# Patient Record
Sex: FEMALE | Race: WHITE
[De-identification: names, ages, dates, MRNs, and addresses within clinical notes are randomized per-mention and may not be internally consistent; named-entity substitution may affect disease eponyms.]

---

## 2018-01-02 ENCOUNTER — HOSPITAL ENCOUNTER (OUTPATIENT)
Dept: HOSPITAL 47 - RADMAMWWP | Age: 73
Discharge: HOME | End: 2018-01-02
Payer: MEDICARE

## 2018-01-02 DIAGNOSIS — Z12.31: Primary | ICD-10-CM

## 2018-01-02 PROCEDURE — 77067 SCR MAMMO BI INCL CAD: CPT

## 2018-01-02 PROCEDURE — 77063 BREAST TOMOSYNTHESIS BI: CPT

## 2018-01-03 NOTE — MM
Reason for exam: screening  (asymptomatic).

Last mammogram was performed 1 year and 2 months ago.



History:

Patient is postmenopausal and is nulliparous.



Physical Findings:

A clinical breast exam by your physician is recommended on an annual basis and 

results should be correlated with mammographic findings.



MG 3D Screening Mammo W/Cad

Bilateral CC and MLO view(s) were taken.

Prior study comparison: October 25, 2016, bilateral MG 3d screening mammo w/cad.  

August 31, 2012, bilateral digital screening mammo w/CAD.

Focal asymmetry in the right upper outer quadrant.  No significant changes when 

compared with prior studies.





ASSESSMENT: Benign, BI-RAD 2



RECOMMENDATION:

Routine screening mammogram of both breasts in 1 year.

## 2019-02-08 ENCOUNTER — HOSPITAL ENCOUNTER (OUTPATIENT)
Dept: HOSPITAL 47 - RADBDWWP | Age: 74
Discharge: HOME | End: 2019-02-08
Attending: FAMILY MEDICINE
Payer: MEDICARE

## 2019-02-08 DIAGNOSIS — M85.80: Primary | ICD-10-CM

## 2019-02-08 PROCEDURE — 77080 DXA BONE DENSITY AXIAL: CPT

## 2019-02-11 NOTE — BD
EXAMINATION TYPE: Axial Bone Density

 

DATE OF EXAM: 2/8/2019

 

COMPARISON: 10/25/2016

 

CLINICAL HISTORY:

 

Height:  59.7 IN

Weight:  133 LBS

 

FRAX RISK QUESTIONS:

Rheumatoid Arthritis: YES

 

RISK FACTORS 

HISTORY OF: 

Active: YES

Postmenopausal woman: AGE 49

 

MEDICATIONS: 

Additional Medications: VIT D, CALCIUM, LISINOPRIL, VIT E, BIOTIN, ASPIRIN, B12, GLUCOSAMINE, ATORVAS
TATIN 

 

 

 

EXAM MEASUREMENTS: 

Bone mineral densitometry was performed using the [employer] System.

Bone mineral density as measured about the Lumbar spine is:  

----- L1-L4(G/cm2): 1.064

T Score Values are as follows:

----- L2: -1.3

----- L3: -0.5

----- L4: -0.7

----- L1-L4: -1.0

Bone mineral density has: Decreased -0.7% since study of: 10/25/2016

 

Bone mineral density about the R hip (g/cm2): 0.699

Bone mineral density about the L hip (g/cm2): 0.687

T Score values are as follows:

-----R Neck: -2.4

-----L Neck: -2.5

-----R Total: -1.5

-----L Total: -1.8

Bone mineral density has: Decreased -1.1% since study of: 10/25/2016

 

 

IMPRESSION:

Osteopenia (T Score between -2.5 and -1).

 

There is slightly increased risk of fracture and the patient may be considered 

for treatment. 

 

Re-Screen 2-5 years.

 

 

 

 

 

NOTE:  T-SCORE=SD OF THE YOUNG ADULT MEAN.

## 2019-02-12 ENCOUNTER — HOSPITAL ENCOUNTER (OUTPATIENT)
Dept: HOSPITAL 47 - RADMAMWWP | Age: 74
Discharge: HOME | End: 2019-02-12
Attending: FAMILY MEDICINE
Payer: MEDICARE

## 2019-02-12 DIAGNOSIS — Z12.31: Primary | ICD-10-CM

## 2019-02-12 PROCEDURE — 77067 SCR MAMMO BI INCL CAD: CPT

## 2019-02-12 PROCEDURE — 77063 BREAST TOMOSYNTHESIS BI: CPT

## 2019-02-14 NOTE — MM
Reason for exam: screening  (asymptomatic).

Last mammogram was performed 1 year and 1 month ago.



History:

Patient is postmenopausal and is nulliparous.



Physical Findings:

A clinical breast exam by your physician is recommended on an annual basis and 

results should be correlated with mammographic findings.



MG 3D Screening Mammo W/Cad

Bilateral CC and MLO view(s) were taken.

Prior study comparison: January 2, 2018, bilateral MG 3d screening mammo w/cad.  

October 25, 2016, bilateral MG 3d screening mammo w/cad.

The breast tissue is heterogeneously dense. This may lower the sensitivity of 

mammography.  No significant changes when compared with prior studies.





ASSESSMENT: Negative, BI-RAD 1



RECOMMENDATION:

Routine screening mammogram of both breasts in 1 year.

## 2019-03-01 ENCOUNTER — HOSPITAL ENCOUNTER (OUTPATIENT)
Dept: HOSPITAL 47 - ORWHC2ENDO | Age: 74
Discharge: HOME | End: 2019-03-01
Attending: INTERNAL MEDICINE
Payer: MEDICARE

## 2019-03-01 VITALS — RESPIRATION RATE: 16 BRPM

## 2019-03-01 VITALS — TEMPERATURE: 98.7 F

## 2019-03-01 VITALS — BODY MASS INDEX: 25.1 KG/M2

## 2019-03-01 VITALS — SYSTOLIC BLOOD PRESSURE: 101 MMHG | HEART RATE: 71 BPM | DIASTOLIC BLOOD PRESSURE: 65 MMHG

## 2019-03-01 DIAGNOSIS — K52.9: Primary | ICD-10-CM

## 2019-03-01 DIAGNOSIS — Z79.899: ICD-10-CM

## 2019-03-01 DIAGNOSIS — Z87.891: ICD-10-CM

## 2019-03-01 DIAGNOSIS — I10: ICD-10-CM

## 2019-03-01 DIAGNOSIS — K22.70: ICD-10-CM

## 2019-03-01 DIAGNOSIS — Z79.82: ICD-10-CM

## 2019-03-01 DIAGNOSIS — K44.9: ICD-10-CM

## 2019-03-01 DIAGNOSIS — E78.5: ICD-10-CM

## 2019-03-01 PROCEDURE — 88305 TISSUE EXAM BY PATHOLOGIST: CPT

## 2019-03-01 PROCEDURE — 43239 EGD BIOPSY SINGLE/MULTIPLE: CPT

## 2019-03-01 PROCEDURE — 45380 COLONOSCOPY AND BIOPSY: CPT

## 2019-03-01 NOTE — P.PCN
Date of Procedure: 03/01/19


Procedure(s) Performed: 


Brief history:


Patient is a pleasant 73-year-old white female, scheduled for an elective upper 

endoscopy as well as colonoscopy as a part of evaluation of abdominal pain and 

chronic diarrhea for the last 3 months duration.  She has bowel movements every 

day with no blood or mucus in the stool.  No weight loss.





Procedure performed:


Esophagogastroduodenoscopy with biopsy


Colonoscopy with biopsies





Preoperative diagnosis:


Abdominal pain


Chronic diarrhea





Anesthesia: MAC





Procedure:


After informed consent was obtained from the patient  was brought into the 

endoscopy unit and IV  sedation was administered by anesthesia under continuous 

monitoring.  Initially upper endoscopy was done.  The Olympus  video 

endoscope was inserted inserted into the mouth and esophagus intubated without 

any difficulty and was gradually advanced into the stomach and duodenum and 

carefully examined.  The bulb and second part of the duodenum appeared normal.  

Biopsies were done from the duodenum to rule out celiac disease.  The scope was 

then withdrawn into the stomach adequately insufflated with air and upon 

careful examination  the antrum and body, cardia and fundus appeared normal.  

The scope was then withdrawn into the esophagus.  The GE junction was located 

at 32 cm to the incisors.  It appeared regular but there was a short tongue of 

Robertson's appearing mucosa extending 5 mm proximal to the GE junction and this 

was biopsied.  Rest of the esophagus appeared normal.  Patient tolerated the 

procedure well.





At this time the patient continued to remain sedation.  Initial digital rectal 

examination was normal.  Olympus  video colonoscope was then inserted 

into the rectum and gradually advanced to the cecum without any difficulty.  

Careful examination was performed as the scope was gradually being withdrawn.  

The prep was excellent.  The cecum, ascending colon, transverse colon, 

descending colon, sigmoid colon and rectum appeared normal.  Retroflexion was 

performed in the rectum and no lesions were noted.  Random biopsies were done 

from ascending and descending colon to rule out microscopic/collagenous 

colitis.  Patient tolerated the procedure well.





Impression:


1.  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 

hernia


2.  Colonoscopy was essentially within normal limits with no evidence of 

colitis or colorectal neoplasia.  








Recommendations:


Findings of this examination were discussed with the patient as well as her 

family.  She was advised to follow with the biopsy results.shows advised to use 

over-the-counter Imodium as needed for the chronic diarrhea.

## 2021-06-28 ENCOUNTER — HOSPITAL ENCOUNTER (OUTPATIENT)
Dept: HOSPITAL 47 - RADMAMWWP | Age: 76
Discharge: HOME | End: 2021-06-28
Attending: FAMILY MEDICINE
Payer: MEDICARE

## 2021-06-28 DIAGNOSIS — Z78.0: ICD-10-CM

## 2021-06-28 DIAGNOSIS — Z80.3: ICD-10-CM

## 2021-06-28 DIAGNOSIS — Z12.31: Primary | ICD-10-CM

## 2021-06-28 PROCEDURE — 77067 SCR MAMMO BI INCL CAD: CPT

## 2021-06-28 PROCEDURE — 77063 BREAST TOMOSYNTHESIS BI: CPT

## 2021-06-29 NOTE — MM
Reason for exam: screening  (asymptomatic).

Last mammogram was performed 2 years and 5 months ago.



History:

Patient is postmenopausal and is nulliparous.

Family history of breast cancer in sister at age 71.



Physical Findings:

A clinical breast exam by your physician is recommended on an annual basis and 

results should be correlated with mammographic findings.



MG 3D Screening Mammo W/Cad

Bilateral CC and MLO view(s) were taken.

Prior study comparison: February 12, 2019, bilateral MG 3d screening mammo w/cad. 

January 2, 2018, bilateral MG 3d screening mammo w/cad.

The breast tissue is heterogeneously dense. This may lower the sensitivity of 

mammography.  Stable benign calcifications. There is no discrete abnormality.  No 

significant changes when compared with prior studies.





ASSESSMENT: Benign, BI-RAD 2



RECOMMENDATION:

Routine screening mammogram of both breasts in 1 year.

## 2022-06-03 ENCOUNTER — HOSPITAL ENCOUNTER (OUTPATIENT)
Dept: HOSPITAL 47 - ORWHC2ENDO | Age: 77
Discharge: HOME | End: 2022-06-03
Attending: INTERNAL MEDICINE
Payer: MEDICARE

## 2022-06-03 VITALS — DIASTOLIC BLOOD PRESSURE: 69 MMHG | SYSTOLIC BLOOD PRESSURE: 104 MMHG | HEART RATE: 61 BPM

## 2022-06-03 VITALS — RESPIRATION RATE: 16 BRPM | TEMPERATURE: 97.3 F

## 2022-06-03 VITALS — BODY MASS INDEX: 21.5 KG/M2

## 2022-06-03 DIAGNOSIS — Z90.49: ICD-10-CM

## 2022-06-03 DIAGNOSIS — E78.5: ICD-10-CM

## 2022-06-03 DIAGNOSIS — Z12.11: Primary | ICD-10-CM

## 2022-06-03 DIAGNOSIS — Z79.899: ICD-10-CM

## 2022-06-03 DIAGNOSIS — K63.5: ICD-10-CM

## 2022-06-03 DIAGNOSIS — I10: ICD-10-CM

## 2022-06-03 PROCEDURE — 45385 COLONOSCOPY W/LESION REMOVAL: CPT

## 2022-06-03 PROCEDURE — 88305 TISSUE EXAM BY PATHOLOGIST: CPT

## 2022-06-03 PROCEDURE — 45380 COLONOSCOPY AND BIOPSY: CPT

## 2022-06-03 NOTE — P.PCN
Date of Procedure: 06/03/22


Procedure(s) Performed: 


BRIEF HISTORY: Patient is a 76-year-old pleasant female scheduled for an 

elective colonoscopy as a part of screening for colorectal neoplasia.  





PROCEDURE PERFORMED: Colonoscopy with biopsy and snare polyp at. 





PREOPERATIVE DIAGNOSIS: Screening for colon cancer. 





IV sedation per Anesthesia. 





PROCEDURE: After informed consent was obtained, the patient, was brought into 

the endoscopy unit. IV sedation was administered by Anesthesia under continuous 

monitoring.  Digital rectal examination was normal. Initially the Olympus CF-160

flexible video colonoscope was then inserted in the rectum, gradually advanced 

into the cecum without any difficulty. Careful examination was performed as the 

scope was gradually being withdrawn. Ileocecal valve and the appendiceal orifice

were visualized and appeared normal.  Prep was excellent. Mucosa of the cecum, 

appeared normal.  There was a 3 mm cecal polyp that was removed by cold biopsy. 

Rest of the ascending colon, transverse colon, descending colon, appeared 

normal.  The sigmoid: There was a 6 minute a polyp that was removed by snare 

polypectomy.  Rest of the sigmoid colon, and rectum appeared normal. 

Retroflexion was performed in the rectum and no lesions were seen. The patient 

tolerated the procedure well. 





IMPRESSION: 


3 mm cecal polyp status post cold biopsy


6 mm sigmoid polyp status post polypectomy





RECOMMENDATIONS:  Findings of this examination were discussed with the patient 

as well as a family.  She was advised to follow with the biopsy results.  If the

biopsy reveals adenoma she can have a repeat colonoscopy in 5 years.

## 2022-10-19 ENCOUNTER — HOSPITAL ENCOUNTER (OUTPATIENT)
Dept: HOSPITAL 47 - RADMAMWWP | Age: 77
Discharge: HOME | End: 2022-10-19
Attending: FAMILY MEDICINE
Payer: MEDICARE

## 2022-10-19 DIAGNOSIS — Z80.3: ICD-10-CM

## 2022-10-19 DIAGNOSIS — M81.0: ICD-10-CM

## 2022-10-19 DIAGNOSIS — Z12.31: Primary | ICD-10-CM

## 2022-10-19 DIAGNOSIS — Z78.0: ICD-10-CM

## 2022-10-19 PROCEDURE — 77063 BREAST TOMOSYNTHESIS BI: CPT

## 2022-10-19 PROCEDURE — 77080 DXA BONE DENSITY AXIAL: CPT

## 2022-10-19 PROCEDURE — 77067 SCR MAMMO BI INCL CAD: CPT

## 2022-10-19 NOTE — BD
EXAMINATION TYPE: Axial Bone Density

 

DATE OF EXAM: 10/19/2022

 

COMPARISON: NONE

 

CLINICAL HISTORY: 77 years year old Female.  ICD-10 CODE: M85.89 BONE DISORDERS

 

Height:  5 FT 1 IN

Weight:  112

 

FRAX RISK QUESTIONS:

Alcohol (3 or more units per day):  NO

Family History (Parent hip fracture):  NO

Glucocorticoids (More than 3mos):  NO

           (Ex: prednisone, prednisolone, methylprednisolone, dexamethasone, and hydrocortisone).    
     

History of Fracture in Adulthood: NO

Secondary Osteoporosis:

  1.  Type 1 Diabetes: NO

  2.  Hyperthyroidism: NO

  3.  Menopause before 45: YES

  4.  Malnutrition: NO

  5.  Chronic liver disease: NO

Rheumatoid Arthritis: NO

Current Tobacco Use: NO

 

RISK FACTORS 

HISTORY OF: 

Surgery to Spine/Hip(right/left)/Wrist (right/left): NO

Family History of Osteoporosis: NO

Active: YES

Diet low in dairy products/other sources of calcium:  NO

Postmenopausal woman: YES

Take estrogen and/or progesterone medications: NO

Lost more than 2 inches in height since high school: NO

Frequent falls: NO

Poor Health: GOOD

Hyperparathyroidism: NO

Adrenal Insufficiency: NO

 

MEDICATIONS: 

Additional Medications: LISINOPRIL, ATORVASTATIN, CALCIUM,D3

 

 

Additional History:

 

 

EXAM MEASUREMENTS: 

Bone mineral densitometry was performed using the [employer] System.

Bone mineral density as measured about the Lumbar spine is:  

----- L1-L4(G/cm2): 1.047

T Score Values are as follows:

----- L1: -1.3

----- L2: -1.8

----- L3: -0.5

----- L4: -0.5

----- L1-L4: -1.1

Bone mineral density has: DECREASED  -3.4 % since study of: 2016

 

Bone mineral density about the R hip (g/cm2): 0.661

Bone mineral density about the L hip (g/cm2): 0.627

T Score values are as follows:

-----R Neck: -2.7

-----L Neck: -3.0

-----R Total: -2.3

-----L Total: -2.5

Bone mineral density has: DECREASED  -13.0 % since study of: 2016

 

 

FRAX%s: The graph provided illustrates a 19.7 % chance for a major osteoporotic fx and a 8.0 % chance
 for the hips probability for fx in 10 years time.

 

IMPRESSION:

Osteoporosis (T Score less than -2.5).

 

There is increased fracture risk and therapy is usually indicated based on age.

 

Re-Screen 1-2 years.

 

NOTE:  T-SCORE=SD OF THE YOUNG ADULT MEAN.

## 2022-10-20 NOTE — MM
Reason for Exam: Screening  (asymptomatic). 

Last mammogram was performed 1 year(s) and 4 month(s) ago. 





Patient History: 

Menarche at age 16. Patient has no children. Postmenopausal.

Sister had breast cancer, age 71. 





Risk Values: 

Emilia 5 year model risk: 3.2%.

NCI Lifetime model risk: 6.4%.





Prior Study Comparison: 

1/2/2018 Bilateral Screening Mammogram, [facility]. 2/12/2019 Bilateral Screening Mammogram, [facility]. 6/28/2021

Bilateral Screening Mammogram, [facility]. 





Tissue Density: 

The breast tissue is heterogeneously dense. This may lower the sensitivity of mammography.





Findings: 

Analyzed By CAD. 

Some benign-appearing round and vascular calcifications bilaterally are redemonstrated.

Benign-appearing bilateral axillary lymph nodes are again seen.



There is no suspicious group of microcalcifications or new suspicious mass in either breast. 





Overall Assessment: Benign, BI-RAD 2





Management: 

Screening Mammogram of both breasts in 1 year.

Some advise bilateral breast ultrasound surveillance in patients with background dense tissue. A

clinical breast exam by your physician is recommended on an annual basis and results should be

correlated with mammographic findings.



Electronically signed and approved by: Memo Patel M.D.

## 2023-07-05 ENCOUNTER — HOSPITAL ENCOUNTER (OUTPATIENT)
Dept: HOSPITAL 47 - LABPAT | Age: 78
Discharge: HOME | End: 2023-07-05
Attending: UROLOGY
Payer: MEDICARE

## 2023-07-05 DIAGNOSIS — N20.1: ICD-10-CM

## 2023-07-05 DIAGNOSIS — R31.0: ICD-10-CM

## 2023-07-05 DIAGNOSIS — Z01.812: Primary | ICD-10-CM

## 2023-07-05 LAB
ANION GAP SERPL CALC-SCNC: 9.1 MMOL/L (ref 4–12)
BASOPHILS # BLD AUTO: 0.04 X 10*3/UL (ref 0–0.1)
BASOPHILS NFR BLD AUTO: 0.5 %
BUN SERPL-SCNC: 16.2 MG/DL (ref 9–27)
BUN/CREAT SERPL: 20.25 RATIO (ref 12–20)
CALCIUM SPEC-MCNC: 10.5 MG/DL (ref 8.7–10.3)
CHLORIDE SERPL-SCNC: 104 MMOL/L (ref 96–109)
CO2 SERPL-SCNC: 26.9 MMOL/L (ref 21.6–31.8)
EOSINOPHIL # BLD AUTO: 0.14 X 10*3/UL (ref 0.04–0.35)
EOSINOPHIL NFR BLD AUTO: 1.9 %
ERYTHROCYTE [DISTWIDTH] IN BLOOD BY AUTOMATED COUNT: 4.14 X 10*6/UL (ref 4.1–5.2)
ERYTHROCYTE [DISTWIDTH] IN BLOOD: 12.7 % (ref 11.5–14.5)
GLUCOSE SERPL-MCNC: 81 MG/DL (ref 70–110)
HCT VFR BLD AUTO: 39.6 % (ref 37.2–46.3)
HGB BLD-MCNC: 12.7 D/DL (ref 12–15)
IMM GRANULOCYTES BLD QL AUTO: 0.1 %
LYMPHOCYTES # SPEC AUTO: 1.87 X 10*3/UL (ref 0.9–5)
LYMPHOCYTES NFR SPEC AUTO: 25.5 %
MCH RBC QN AUTO: 30.7 PG (ref 27–32)
MCHC RBC AUTO-ENTMCNC: 32.1 D/DL (ref 32–37)
MCV RBC AUTO: 95.7 FL (ref 80–97)
MONOCYTES # BLD AUTO: 0.6 X 10*3/UL (ref 0.2–1)
MONOCYTES NFR BLD AUTO: 8.2 %
NEUTROPHILS # BLD AUTO: 4.67 X 10*3/UL (ref 1.8–7.7)
NEUTROPHILS NFR BLD AUTO: 63.8 %
NRBC BLD AUTO-RTO: 0 X 10*3/UL (ref 0–0.01)
PH UR: 6.5 [PH]
PLATELET # BLD AUTO: 284 X 10*3/UL (ref 140–440)
POTASSIUM SERPL-SCNC: 4.8 MMOL/L (ref 3.5–5.5)
SODIUM SERPL-SCNC: 140 MMOL/L (ref 135–145)
SP GR UR: 1 (ref 1–1.03)
UROBILINOGEN UR QL: 0.2 E.U./DL
WBC # BLD AUTO: 7.33 X 10*3/UL (ref 4.5–10)

## 2023-07-05 PROCEDURE — 85025 COMPLETE CBC W/AUTO DIFF WBC: CPT

## 2023-07-05 PROCEDURE — 87086 URINE CULTURE/COLONY COUNT: CPT

## 2023-07-05 PROCEDURE — 36415 COLL VENOUS BLD VENIPUNCTURE: CPT

## 2023-07-05 PROCEDURE — 81001 URINALYSIS AUTO W/SCOPE: CPT

## 2023-07-05 PROCEDURE — 80048 BASIC METABOLIC PNL TOTAL CA: CPT

## 2023-07-11 ENCOUNTER — HOSPITAL ENCOUNTER (OUTPATIENT)
Dept: HOSPITAL 47 - OR | Age: 78
Discharge: HOME | End: 2023-07-11
Attending: UROLOGY
Payer: MEDICARE

## 2023-07-11 VITALS — TEMPERATURE: 97 F

## 2023-07-11 VITALS — BODY MASS INDEX: 20 KG/M2

## 2023-07-11 VITALS — HEART RATE: 65 BPM | DIASTOLIC BLOOD PRESSURE: 63 MMHG | SYSTOLIC BLOOD PRESSURE: 124 MMHG

## 2023-07-11 VITALS — RESPIRATION RATE: 16 BRPM

## 2023-07-11 DIAGNOSIS — Z79.899: ICD-10-CM

## 2023-07-11 DIAGNOSIS — E78.5: ICD-10-CM

## 2023-07-11 DIAGNOSIS — I10: ICD-10-CM

## 2023-07-11 DIAGNOSIS — N20.0: Primary | ICD-10-CM

## 2023-07-11 DIAGNOSIS — Z87.891: ICD-10-CM

## 2023-07-11 DIAGNOSIS — J40: ICD-10-CM

## 2023-07-11 DIAGNOSIS — M19.90: ICD-10-CM

## 2023-07-11 DIAGNOSIS — Z98.890: ICD-10-CM

## 2023-07-11 PROCEDURE — 82365 CALCULUS SPECTROSCOPY: CPT

## 2023-07-11 PROCEDURE — 52353 CYSTOURETERO W/LITHOTRIPSY: CPT

## 2023-07-11 PROCEDURE — 74018 RADEX ABDOMEN 1 VIEW: CPT

## 2023-07-11 NOTE — P.HPIHPCON
History of Present Illness


H&P Date: 07/11/23


Chief Complaint: right ureteral stone





This is a 77-year-old female with history of an 8 mm right-sided proximal stones

and  additional right-sided nonobstructive renal stones.  She is status post 

stent insertion in June.  Presents today for right-sided ureteroscopy with stella

ium laser.  Discussed with her the surgery detail.  Discussed risks which was 

but not limited to bleeding, infection, injury to ureter.  Risk of anesthesia 

was also discussed with her.  She understood all the risk and agreed to proceed 

with right-sided ureteroscopy, with holmium laser lithotripsy, stone basketing 

and stent removal


Consent for Procedure: 


I have explained the operation/procedure to the patient, including the risks, 

benefits, side effects, alternative therapies (including not receiving the 

proposed treatment or service), the likelihood of the patient achieving his/her 

goals, and potential recuperation problems for the procedure/sedation/analgesia,

as well as any blood products, if indicated. I also explained to the patient the

risks, benefits and side effects of the alternatives, as well as the risks 

related to not receiving the proposed procedure, care, treatment, or services.








Past Medical History


Past Medical History: Hyperlipidemia, Hypertension, Osteoarthritis (OA)


Additional Past Medical History / Comment(s): hx bronchitis, kidney stones


History of Any Multi-Drug Resistant Organisms: None Reported


Past Surgical History: Appendectomy


Additional Past Surgical History / Comment(s): COLONOSCOPY, URETERAL STENT


Past Anesthesia/Blood Transfusion Reactions: No Reported Reaction


Additional Past Anesthesia/Blood Transfusion Reaction / Comment(s): .


Past Psychological History: No Psychological Hx Reported


Smoking Status: Former smoker


Past Alcohol Use History: Rare


Additional Past Alcohol Use History / Comment(s): quit smoking 1999 (age 54). 

,smoked approx 30 yrs <1ppd


Past Drug Use History: None Reported





- Past Family History


  ** Sister(s)


Family Medical History: Cancer


Additional Family Medical History / Comment(s): breast cancer





  ** Mother


Family Medical History: No Reported History





Medications and Allergies


                                Home Medications











 Medication  Instructions  Recorded  Confirmed  Type


 


Atorvastatin Calcium [Lipitor] 40 mg PO HS 02/27/19 07/05/23 History


 


Calcium Carbonate/Vitamin D3 1 each PO Q48H 02/27/19 07/05/23 History





[Caltrate 600 Plus D3 Tablet]    


 


Cholecalciferol [Vitamin D3] 2,000 unit PO DAILY 02/27/19 07/05/23 History


 


Glucosam/Taurus-Msm1/C/Juan/Bosw 1 each PO Q2D 02/27/19 07/05/23 History





[Glucosamine-Chondroitin-MSM Tb]    


 


lisinopriL [Zestril] 5 mg PO BID 02/27/19 07/05/23 History


 


Alendronate Sodium 70 mg PO SA 07/05/23 07/05/23 History


 


Biotin 5 mg PO HS 07/05/23 07/05/23 History








                                    Allergies











Allergy/AdvReac Type Severity Reaction Status Date / Time


 


No Known Allergies Allergy   Verified 07/05/23 11:38














Surgical - Exam





- General


no distress, no pain





- Eyes


normal ocular movement, no pale





- ENT


normal nares, normal mucosa





- Respiratory


normal expansion, normal respiratory effort





- Abdomen


Abdomen: soft, non tender





Assessment and Plan


Assessment: 





OR for right-sided ureteroscopy, holmium laser lithotripsy, stone basketing and 

stent removal

## 2023-07-11 NOTE — FL
Intraoperative/procedural fluoroscopic services were provided. Total fluoroscopy time is 19 seconds w
ith a total of 2 submitted images to PACS. Please see the operative/procedural note for further detai
ls.

DAP: [id number] mGym2

## 2023-07-11 NOTE — P.OP
Date of Procedure: 07/11/23


Preoperative Diagnosis: 


Right renal stone, renal stone


Postoperative Diagnosis: 


same 


Procedure(s) Performed: 


Cystoscopy, right ureteroscopy, holmium laser lithotripsy, stone basketing and 

stent removal


Implants: 


None


Anesthesia: GETA


Surgeon: Damon Grey


Estimated Blood Loss (ml): 1


Pathology: other (Right renal stone)


Condition: stable


Disposition: PACU


Indications for Procedure: 


This is a 77-year-old female with history of an 8 mm right-sided proximal stones

and  additional right-sided nonobstructive renal stones.  She is status post 

stent insertion in June.  Presents today for right-sided ureteroscopy with 

holmium laser.  Discussed with her the surgery detail.  Discussed risks which 

was but not limited to bleeding, infection, injury to ureter.  Risk of 

anesthesia was also discussed with her.  She understood all the risk and agreed 

to proceed with right-sided ureteroscopy, with holmium laser lithotripsy, stone 

basketing and stent removal


Operative Findings: 


Large stone in the renal pelvis, multiple's smaller stones throughout the kidney


Description of Procedure: 


Patient brought to the operating room, general anesthesia was induced.  She was 

prepped and draped in sterile fashion and placed in dorsal lithotomy position 

cystoscopy fitted 21-Bolivian sheath was inserted per urethra, cystoscopy was 

performed which showed no abnormality within the bladder.  At this time the 

right ureteral orifice was intubated with a sensor wire.  Next a 1113 Bolivian 

access sheath was passed over the wire and into the proximal ureter.  Next a 

flexible ureteroscope was inserted through the access sheath, renoscopy was 

performed showed a large stone in the renal pelvis and multiple stones 

throughout the kidney.  Using the holmium laser the renal pelvis stones was 

dusted, sizable fragments were removed using the stone basket.  The rest of the 

renal stones were dusted.  repeat renoscopy showed no sizable stones or injury 

to the kidney, pullback ureteroscopy was performed which showed no injury to the

ureter or any ureteral stones, of note patient did have a slightly torturous 

proximal ureter, but the ureter was quite dilated.  At this time the bladder was

emptied at the end of the case.  Patient tolerated procedure well was taken to 

recovery in stable condition

## 2023-07-11 NOTE — XR
EXAMINATION TYPE: XR KUB

 

DATE OF EXAM: 7/11/2023

 

COMPARISON: NONE

 

HISTORY: Preop

 

TECHNIQUE: One view abdominal series

 

FINDINGS:  

The osseous structures are intact.  The bowel gas pattern is nonspecific. There is a calcification lo
wer pole left kidney measuring 5 mm. Additional midpole punctate 2 mm calculus.

 

Right kidney demonstrates a 7 mm upper pole calculus and 4 mm mid to lower pole calculus.

 

Double-J right ureteral stent is seen in position. There are calcifications along the course of the d
istal margin of the stent with a maximal dimension of 3 mm. Majority of the calcifications appear to 
lie outside the course of the ureter and likely are vascular.

 

Hypertrophic and degenerative changes of the spine. Bilateral hip arthropathy correlate for femoral a
cetabular impingement. Bilateral SI joint disease compatible with sacroiliitis on the left.

 

IMPRESSION:

1. Bilateral renal calculi. Could not exclude a 3 mm distal right ureteral calculus along the course 
of the stent.